# Patient Record
Sex: FEMALE | Race: ASIAN
[De-identification: names, ages, dates, MRNs, and addresses within clinical notes are randomized per-mention and may not be internally consistent; named-entity substitution may affect disease eponyms.]

---

## 2018-02-08 ENCOUNTER — TRANSCRIPTION ENCOUNTER (OUTPATIENT)
Age: 53
End: 2018-02-08

## 2018-02-19 ENCOUNTER — TRANSCRIPTION ENCOUNTER (OUTPATIENT)
Age: 53
End: 2018-02-19

## 2018-03-08 ENCOUNTER — TRANSCRIPTION ENCOUNTER (OUTPATIENT)
Age: 53
End: 2018-03-08

## 2018-07-11 ENCOUNTER — TRANSCRIPTION ENCOUNTER (OUTPATIENT)
Age: 53
End: 2018-07-11

## 2018-07-30 ENCOUNTER — TRANSCRIPTION ENCOUNTER (OUTPATIENT)
Age: 53
End: 2018-07-30

## 2019-10-28 ENCOUNTER — TRANSCRIPTION ENCOUNTER (OUTPATIENT)
Age: 54
End: 2019-10-28

## 2020-05-29 ENCOUNTER — TRANSCRIPTION ENCOUNTER (OUTPATIENT)
Age: 55
End: 2020-05-29

## 2020-08-06 ENCOUNTER — EMERGENCY (EMERGENCY)
Facility: HOSPITAL | Age: 55
LOS: 1 days | Discharge: HOME | End: 2020-08-06
Attending: EMERGENCY MEDICINE
Payer: COMMERCIAL

## 2020-08-06 VITALS
DIASTOLIC BLOOD PRESSURE: 82 MMHG | TEMPERATURE: 98 F | WEIGHT: 134.04 LBS | OXYGEN SATURATION: 98 % | RESPIRATION RATE: 17 BRPM | SYSTOLIC BLOOD PRESSURE: 169 MMHG | HEART RATE: 69 BPM

## 2020-08-06 VITALS
DIASTOLIC BLOOD PRESSURE: 72 MMHG | TEMPERATURE: 98 F | RESPIRATION RATE: 16 BRPM | SYSTOLIC BLOOD PRESSURE: 158 MMHG | HEART RATE: 59 BPM | OXYGEN SATURATION: 100 %

## 2020-08-06 LAB — HIV 1 & 2 AB SERPL IA.RAPID: SIGNIFICANT CHANGE UP

## 2020-08-06 PROCEDURE — 93308 TTE F-UP OR LMTD: CPT | Mod: 26

## 2020-08-06 PROCEDURE — 99285 EMERGENCY DEPT VISIT HI MDM: CPT

## 2020-08-06 PROCEDURE — 93005 ELECTROCARDIOGRAM TRACING: CPT

## 2020-08-06 PROCEDURE — 71045 X-RAY EXAM CHEST 1 VIEW: CPT | Mod: 26

## 2020-08-06 PROCEDURE — 99284 EMERGENCY DEPT VISIT MOD MDM: CPT | Mod: 25

## 2020-08-06 PROCEDURE — 86703 HIV-1/HIV-2 1 RESULT ANTBDY: CPT

## 2020-08-06 PROCEDURE — 93010 ELECTROCARDIOGRAM REPORT: CPT

## 2020-08-06 PROCEDURE — 71045 X-RAY EXAM CHEST 1 VIEW: CPT

## 2020-08-06 PROCEDURE — 93308 TTE F-UP OR LMTD: CPT

## 2020-08-06 RX ORDER — IBUPROFEN 200 MG
600 TABLET ORAL ONCE
Refills: 0 | Status: COMPLETED | OUTPATIENT
Start: 2020-08-06 | End: 2020-08-06

## 2020-08-06 RX ADMIN — Medication 600 MILLIGRAM(S): at 11:00

## 2020-08-06 RX ADMIN — Medication 600 MILLIGRAM(S): at 10:19
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
## 2020-08-06 NOTE — ED PROVIDER NOTE - ATTENDING CONTRIBUTION TO CARE
I performed a history and physical exam of the patient and discussed their management with the student. I reviewed the students note and agree with the documented findings and plan of care. My medical decison making and observations are found above.
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
## 2020-08-06 NOTE — ED PROVIDER NOTE - PMH
Attempted to see pt for PT evaluation. Per [facility], pt was placed on bedrest until tomorrow. Will hold PT evaluation today and will check back tomorrow.    No pertinent past medical history <<----- Click to add NO pertinent Past Medical History

## 2020-08-06 NOTE — ED PROVIDER NOTE - NSFOLLOWUPINSTRUCTIONS_ED_ALL_ED_FT
1- Motrin / Tylenol as needed for pain  2- Follow up with your doctor or our medicine clinic   3- If you have any worsening pain, shortness of breath, palpitations, or any other concerns come back to the ER immediately.

## 2020-08-06 NOTE — ED PROVIDER NOTE - NSFOLLOWUPCLINICS_GEN_ALL_ED_FT
[facility] General Internal Medicine  General Internal Medicine  [address] [id number]  Phone: [phone]  Fax:   Follow Up Time:

## 2020-08-06 NOTE — ED PROVIDER NOTE - PATIENT PORTAL LINK FT
You can access the FollowMyHealth Patient Portal offered by [facility] by registering at the following website: [email or web address]. By joining [employer] [facility]s FollowMyHealth portal, you will also be able to view your health information using other applications (apps) compatible with our system.

## 2020-08-06 NOTE — ED PROVIDER NOTE - PHYSICAL EXAMINATION
rory aaaox3 nad ncat perrlgcs 15   neck supple cleared clinically   ctabl no crepitus no sq emphysema , mild ant sternal cw ttp   s1s2 rrr   soft nt nd   pelvis stable   le from no deformity no rotation, no shortening, ambulatory in ed   l knee no jt line ttp no patella ttp , from ,neg lachman ,ext mech intact small effsuion   cn2-12intact strength 5/5 ue le bl
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
## 2020-12-23 PROBLEM — Z78.9 OTHER SPECIFIED HEALTH STATUS: Chronic | Status: ACTIVE | Noted: 2020-08-06

## 2020-12-29 ENCOUNTER — APPOINTMENT (OUTPATIENT)
Dept: ORTHOPEDIC SURGERY | Facility: CLINIC | Age: 55
End: 2020-12-29
Payer: MEDICAID

## 2020-12-29 VITALS
WEIGHT: 130 LBS | OXYGEN SATURATION: 97 % | DIASTOLIC BLOOD PRESSURE: 95 MMHG | SYSTOLIC BLOOD PRESSURE: 165 MMHG | BODY MASS INDEX: 23.92 KG/M2 | HEART RATE: 73 BPM | HEIGHT: 62 IN

## 2020-12-29 DIAGNOSIS — Z87.898 PERSONAL HISTORY OF OTHER SPECIFIED CONDITIONS: ICD-10-CM

## 2020-12-29 DIAGNOSIS — Z86.79 PERSONAL HISTORY OF OTHER DISEASES OF THE CIRCULATORY SYSTEM: ICD-10-CM

## 2020-12-29 DIAGNOSIS — S46.011A STRAIN OF MUSCLE(S) AND TENDON(S) OF THE ROTATOR CUFF OF RIGHT SHOULDER, INITIAL ENCOUNTER: ICD-10-CM

## 2020-12-29 PROBLEM — Z00.00 ENCOUNTER FOR PREVENTIVE HEALTH EXAMINATION: Status: ACTIVE | Noted: 2020-12-29

## 2020-12-29 PROCEDURE — 20611 DRAIN/INJ JOINT/BURSA W/US: CPT | Mod: RT

## 2020-12-29 PROCEDURE — 72040 X-RAY EXAM NECK SPINE 2-3 VW: CPT

## 2020-12-29 PROCEDURE — 99204 OFFICE O/P NEW MOD 45 MIN: CPT | Mod: 25

## 2020-12-29 PROCEDURE — 73030 X-RAY EXAM OF SHOULDER: CPT | Mod: RT

## 2020-12-29 PROCEDURE — 99072 ADDL SUPL MATRL&STAF TM PHE: CPT

## 2020-12-29 RX ORDER — DICLOFENAC SODIUM 75 MG/1
75 TABLET, DELAYED RELEASE ORAL
Qty: 60 | Refills: 0 | Status: ACTIVE | COMMUNITY
Start: 2020-12-29 | End: 1900-01-01

## 2020-12-29 RX ORDER — ALBUTEROL SULFATE
POWDER (GRAM) MISCELLANEOUS
Refills: 0 | Status: ACTIVE | COMMUNITY

## 2020-12-29 NOTE — PHYSICAL EXAM
[de-identified] : Examination of the right shoulder discloses tenderness to the superior region at 160 degrees overhead motion and lateral abduction.  Impingement signs are positive.  Cervical spine muscle spasm to the Trapezial region.  No focal neurovascular deficits to the right upper extremity.  The patient is noting subjective radiating pain and numbness down the right arm. [de-identified] : X-rays taken of the right shoulder are nonspecific in AP lateral and axillary views.  X-rays of the cervical spine disclose cervical straightening with mild disc space narrowing.  Results of the patient's MRI of her right shoulder were discussed

## 2020-12-29 NOTE — PROCEDURE
[de-identified] : Under sterile technique the patient right shoulder was injected with Depo-Medrol and lidocaine.  Insert\par \par A discussion took place with the patient regarding the procedure. General risks and benefits were reviewed.\par The subacromial region of the right shoulder was prepped to attain a sterile field. Ethyl Chloride spray was used as a topical anesthetic. \par A 22-gauge 1-1/2 inch needle was used to inject the medication.\par The procedure was performed utilizing ultrasound guided needle placement with the Sonosite linear transducer in order to visualize and confirm the location of the needle tip and intra-articular delivery of the medication in the exact location desired to achieve maximal benefit from the medication. The images were recorded and saved.\par The injection site was sterilely dressed and the patient tolerated the procedure well, without complications.\par The patient was given post injection instructions including rest, cool pack applications, and take NSAIDs or acetaminophen. The patient was advised that if there are worsening symptoms or any associated problems, to contact our office accordingly

## 2020-12-29 NOTE — DISCUSSION/SUMMARY
[de-identified] : Following the patient's cortisone injection she was advised to continue her physical therapy for both her neck and her shoulder.  She was prescribed diclofenac and will be referred to Dr. Haro for her cervical spine issues.
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
## 2020-12-30 ENCOUNTER — APPOINTMENT (OUTPATIENT)
Dept: ORTHOPEDIC SURGERY | Facility: CLINIC | Age: 55
End: 2020-12-30
Payer: MEDICAID

## 2020-12-30 VITALS
HEIGHT: 62 IN | DIASTOLIC BLOOD PRESSURE: 85 MMHG | BODY MASS INDEX: 23.92 KG/M2 | HEART RATE: 56 BPM | WEIGHT: 130 LBS | OXYGEN SATURATION: 99 % | SYSTOLIC BLOOD PRESSURE: 146 MMHG

## 2020-12-30 DIAGNOSIS — Z78.9 OTHER SPECIFIED HEALTH STATUS: ICD-10-CM

## 2020-12-30 PROCEDURE — 72040 X-RAY EXAM NECK SPINE 2-3 VW: CPT

## 2020-12-30 PROCEDURE — 99214 OFFICE O/P EST MOD 30 MIN: CPT

## 2020-12-30 PROCEDURE — 99072 ADDL SUPL MATRL&STAF TM PHE: CPT

## 2020-12-30 NOTE — ASSESSMENT
[FreeTextEntry1] : This is a 55-year-old female here today for evaluation of right arm pain.  I am concerned given her right triceps and deltoid weakness that she has an acute disc herniation.  She has had an MRI in the past and was told that she had a herniated disc.  Unfortunately she does not have the disc here today.  At this point I think we should begin by starting a new round of physical therapy focused on isometric neck exercises and gentle neck stretching.  She should continue Tylenol and ibuprofen as needed for pain relief.  I will have her follow-up in approximately 2 weeks for repeat clinical evaluation and to go over imaging.  She knows to call back sooner if her symptoms worsen or change in any way.

## 2020-12-30 NOTE — PHYSICAL EXAM
[de-identified] : Cervical Physical Exam\par \par Gait -normal\par \par Station -normal\par \par Sagittal balance -normal\par \par Compensatory mechanism? -None\par \par Horizontal gaze -maintained\par \par Heel walk -normal\par \par Toe walk -normal\par \par Reflexes\par Biceps -normal\par Triceps -normal\par Brachioradialis -normal\par Patellar -normal\par Gastroc -normal\par Clonus -no\par \par Tracey´s -none\par \par Shoulder Exam - pain with forward flexion of the right shoulder\par \par Spurling´s -positive on right\par \par Wrist Pulses -2+ radial/ulnar\par \par Foot Pulses -2+ DP/PT\par \par Cervical range of motion -globally reduced\par \par Sensation \par C5-T1 sensation intact to light touch bilaterally\par \par L1-S1 sensation intact to light touch bilaterally\par \par Motor\par \par \par 	Deltoid	Biceps	Triceps	WF	WE	IO	\par Right	4/5	5/5	3+/5	4/5	5/5	5/5	5/5\par Left	5/5	5/5	5/5	5/5	5/5	5/5	5/5\par \par \par 	IP	Quad	HS	TA	Gastroc	EHL\par Right	5/5	5/5	5/5	5/5	5/5	5/5\par Left	5/5	5/5	5/5	5/5	5/5	5/5\par \par \par  [de-identified] : Cervical radiographs reviewed\par Cervical alignment within normal limits\par Disc height loss noted\par Mild facet arthropathy noted

## 2020-12-30 NOTE — HISTORY OF PRESENT ILLNESS
[de-identified] : This is a 55-year-old female here today for evaluation of her neck and arm pain.  The symptoms have been getting worse over the past several months.  They began in August 2020.  She states that she has pain that radiates down her right arm.  It goes down her lateral arm lateral forearm into her middle finger and ring finger.  She has 80%  arm  pain and 20%  neck pain.  The patient denies any issues with balance or issues with fine motor tasks.  She is already tried a round of conservative management including physical therapy, NSAIDs/Tylenol as well as other treatment modalities.

## 2021-01-13 ENCOUNTER — APPOINTMENT (OUTPATIENT)
Dept: ORTHOPEDIC SURGERY | Facility: CLINIC | Age: 56
End: 2021-01-13
Payer: MEDICAID

## 2021-01-13 PROCEDURE — 99214 OFFICE O/P EST MOD 30 MIN: CPT

## 2021-01-13 PROCEDURE — 99072 ADDL SUPL MATRL&STAF TM PHE: CPT

## 2021-01-13 NOTE — ASSESSMENT
[FreeTextEntry1] : I had a lengthy discussion with the patient in terms of her treatment plan and diagnosis.  At this point I think she would do well with continued conservative treatment.  This would include physical therapy.  She should take Tylenol and ibuprofen as needed for pain only.  I will see her again in 3 months for repeat clinical evaluation.  I encouraged her to reach out at any time if her symptoms worsen or change in any way.

## 2021-01-13 NOTE — HISTORY OF PRESENT ILLNESS
[de-identified] : This is a 55-year-old female here today for evaluation of her neck and arm pain.  The symptoms have been getting better since last time I saw the patient.  She has tried conservative measures including physical therapy which have helped her symptoms significantly.  She denies any radicular symptoms down her arms currently.  She denies any issues with balance or hand dexterity.  Overall she is pleased with her progress.

## 2021-01-13 NOTE — PHYSICAL EXAM
[de-identified] : Cervical Physical Exam\par \par Gait -normal\par \par Station -normal\par \par Sagittal balance -normal\par \par Compensatory mechanism? -None\par \par Horizontal gaze -maintained\par \par Heel walk -normal\par \par Toe walk -normal\par \par Reflexes\par Biceps -normal\par Triceps -normal\par Brachioradialis -normal\par Patellar -normal\par Gastroc -normal\par Clonus -no\par \par Tracey´s -none\par \par Shoulder Exam - pain with forward flexion of the right shoulder\par \par Spurling´s -negative\par \par Wrist Pulses -2+ radial/ulnar\par \par Foot Pulses -2+ DP/PT\par \par Cervical range of motion -globally reduced\par \par Sensation \par C5-T1 sensation intact to light touch bilaterally\par \par L1-S1 sensation intact to light touch bilaterally\par \par Motor\par \par \par 	Deltoid	Biceps	Triceps	WF	WE	IO	\par Right	5/5	5/5	5/5	5/5	5/5	5/5	5/5\par Left	5/5	5/5	5/5	5/5	5/5	5/5	5/5\par \par \par 	IP	Quad	HS	TA	Gastroc	EHL\par Right	5/5	5/5	5/5	5/5	5/5	5/5\par Left	5/5	5/5	5/5	5/5	5/5	5/5\par \par \par Much improved exam [de-identified] : Cervical MRI reviewed\par No areas of central canal stenosis\par Mild foraminal stenosis at C3-C4\par Spondylosis noted through the cervical spine.

## 2021-03-18 ENCOUNTER — TRANSCRIPTION ENCOUNTER (OUTPATIENT)
Age: 56
End: 2021-03-18

## 2021-04-26 ENCOUNTER — APPOINTMENT (OUTPATIENT)
Dept: ORTHOPEDIC SURGERY | Facility: CLINIC | Age: 56
End: 2021-04-26
Payer: COMMERCIAL

## 2021-04-26 VITALS
DIASTOLIC BLOOD PRESSURE: 87 MMHG | SYSTOLIC BLOOD PRESSURE: 153 MMHG | HEIGHT: 62 IN | WEIGHT: 130 LBS | BODY MASS INDEX: 23.92 KG/M2 | HEART RATE: 69 BPM

## 2021-04-26 DIAGNOSIS — M54.12 RADICULOPATHY, CERVICAL REGION: ICD-10-CM

## 2021-04-26 PROCEDURE — 99072 ADDL SUPL MATRL&STAF TM PHE: CPT

## 2021-04-26 PROCEDURE — 99214 OFFICE O/P EST MOD 30 MIN: CPT

## 2021-04-27 NOTE — ASSESSMENT
[FreeTextEntry1] : This is a 55-year-old female here today for predominantly right shoulder complaints.  I assured her today that she has no critical findings on her cervical MRI.  Furthermore she does not have any large tears based upon a read from a right shoulder MRI from December 2020.  As a result I think she should continue with physical therapy.  I have referred her to pain management for possible other treatment options for her complaints.  She can take Tylenol and ibuprofen as needed for pain.  She can follow-up with me in 3 months for repeat clinical evaluation.  Please note that over 30 minutes of time was spent in care of this patient which includes previsit preparation, in person visit, post visit documentation.

## 2021-04-27 NOTE — PHYSICAL EXAM
[de-identified] : Cervical Physical Exam\par \par Gait -normal\par \par Station -normal\par \par Sagittal balance -normal\par \par Compensatory mechanism? -None\par \par Horizontal gaze -maintained\par \par Heel walk -normal\par \par Toe walk -normal\par \par Reflexes\par Biceps -normal\par Triceps -normal\par Brachioradialis -normal\par Patellar -normal\par Gastroc -normal\par Clonus -no\par \par Tracey´s -none\par \par Shoulder Exam - pain with forward flexion of the right shoulder\par \par Spurling´s -negative\par \par Wrist Pulses -2+ radial/ulnar\par \par Foot Pulses -2+ DP/PT\par \par Cervical range of motion -globally reduced\par \par Sensation \par C5-T1 sensation intact to light touch bilaterally\par \par L1-S1 sensation intact to light touch bilaterally\par \par Motor\par \par \par 	Deltoid	Biceps	Triceps	WF	WE	IO	\par Right	5/5	5/5	5/5	5/5	5/5	5/5	5/5\par Left	5/5	5/5	5/5	5/5	5/5	5/5	5/5\par \par \par 	IP	Quad	HS	TA	Gastroc	EHL\par Right	5/5	5/5	5/5	5/5	5/5	5/5\par Left	5/5	5/5	5/5	5/5	5/5	5/5\par \par \par Much improved exam [de-identified] : Cervical MRI reviewed\par No areas of central canal stenosis\par Mild foraminal stenosis at C3-C4\par Spondylosis noted through the cervical spine.

## 2021-04-27 NOTE — HISTORY OF PRESENT ILLNESS
[de-identified] : This is a 55-year-old female here today for evaluation of her neck and arm pain.  The symptoms have been getting better since last time I saw the patient.  She has tried conservative measures including physical therapy which have helped her symptoms significantly.  She denies any radicular symptoms down her arms currently.  She denies any issues with balance or hand dexterity.  Overall she is pleased with her progress.\par \par The above history is from the patient's previous visit\par \par Today the patient complains of right clavicle/shoulder pain.  She denies any bowel bladder issues.  She denies any saddle anesthesia.  She denies any balance issues.  She denies any hand dexterity problems.  She has no focal areas of weakness.  She does have some discomfort with overhead activities especially involving her right shoulder.

## 2021-05-07 ENCOUNTER — APPOINTMENT (OUTPATIENT)
Dept: PHYSICAL MEDICINE AND REHAB | Facility: CLINIC | Age: 56
End: 2021-05-07
Payer: COMMERCIAL

## 2021-05-07 VITALS
SYSTOLIC BLOOD PRESSURE: 129 MMHG | WEIGHT: 137 LBS | HEIGHT: 62 IN | RESPIRATION RATE: 14 BRPM | OXYGEN SATURATION: 100 % | HEART RATE: 69 BPM | BODY MASS INDEX: 25.21 KG/M2 | DIASTOLIC BLOOD PRESSURE: 82 MMHG

## 2021-05-07 DIAGNOSIS — M79.18 MYALGIA, OTHER SITE: ICD-10-CM

## 2021-05-07 PROCEDURE — 99072 ADDL SUPL MATRL&STAF TM PHE: CPT

## 2021-05-07 PROCEDURE — 99203 OFFICE O/P NEW LOW 30 MIN: CPT

## 2021-05-07 NOTE — PHYSICAL EXAM
[FreeTextEntry1] : PE:\par Constitutional: In NAD, calm and cooperative\par HEENT: NCAT, Anicteric sclera, no lid-lag\par Cardio: Extremities appear pink and well perfused, no peripheral edema\par Respiratory: Normal respiratory effort on room air, no accessory muscle use\par Skin: no rashes seen on exposed skin, no visible abrasions\par Psych: Normal affect, intact judgment and insight\par Neuro: Awake, alert and oriented x 3, see below for focused neurological exam\par MSK\par 	Inspection: no gross swelling identified\par 	Palpation: Tenderness over anterior/lateral shoulder shoulder, Mild TTP over R clavicle\par 	ROM: Minimal restriction in IE, ER and Shoulder Abduction\par 	Strength: 5/5 strength in bilateral upper extremities\par 	Reflexes: 2+ Biceps/Brachioradialis reflex bilaterally, Tracey’s negative bilaterally\par 	Sensation: Intact to light touch in bilateral upper extremities\par Special tests: \par Spurling's: negative\par Neer's test: positive on R\par Hawkin's sign:positive on R
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
## 2021-05-07 NOTE — ASSESSMENT
[FreeTextEntry1] : Ms. KEISHA CARDOZO is a 55 year old female who presents with chronic R shoulder/neck pain, likely due to chronic RTC tendonitis, myofascial pain rather than cervical radiculopathy. Patient has said she has had good relief with a R shoulder injection in past. Will recommend:\par - Went on R/B/A of a R SASD bursa injection today with patient but as she had her 2nd COVID vaccine last week, will hold off on any steroid injections at this time. \par - As patient is unsure if she ever had a clavicle X-ray, will order it to rule out any bony abnormalities. \par - Patient to continue HEP\par \par RTC in 1-2 weeks. Can consider steroid injection at that time. Patient educated on red flag signs including any changes to their bowel/bladder control, groin numbness or new weakness. Patient knows to seek immediate attention by calling 911 or going to nearest ER if these symptoms appear.

## 2021-05-22 ENCOUNTER — OUTPATIENT (OUTPATIENT)
Dept: OUTPATIENT SERVICES | Facility: HOSPITAL | Age: 56
LOS: 1 days | End: 2021-05-22
Payer: COMMERCIAL

## 2021-05-22 ENCOUNTER — APPOINTMENT (OUTPATIENT)
Dept: RADIOLOGY | Facility: CLINIC | Age: 56
End: 2021-05-22
Payer: COMMERCIAL

## 2021-05-22 DIAGNOSIS — M89.8X1 OTHER SPECIFIED DISORDERS OF BONE, SHOULDER: ICD-10-CM

## 2021-05-22 PROCEDURE — 73000 X-RAY EXAM OF COLLAR BONE: CPT

## 2021-05-22 PROCEDURE — 73000 X-RAY EXAM OF COLLAR BONE: CPT | Mod: 26,RT

## 2021-06-15 ENCOUNTER — APPOINTMENT (OUTPATIENT)
Dept: PHYSICAL MEDICINE AND REHAB | Facility: CLINIC | Age: 56
End: 2021-06-15
Payer: COMMERCIAL

## 2021-06-15 VITALS
OXYGEN SATURATION: 100 % | HEIGHT: 62 IN | TEMPERATURE: 96.8 F | SYSTOLIC BLOOD PRESSURE: 134 MMHG | DIASTOLIC BLOOD PRESSURE: 80 MMHG | HEART RATE: 71 BPM | BODY MASS INDEX: 25.21 KG/M2 | WEIGHT: 137 LBS

## 2021-06-15 DIAGNOSIS — M75.80 OTHER SHOULDER LESIONS, UNSPECIFIED SHOULDER: ICD-10-CM

## 2021-06-15 PROCEDURE — 20610 DRAIN/INJ JOINT/BURSA W/O US: CPT | Mod: RT

## 2021-06-15 PROCEDURE — 99213 OFFICE O/P EST LOW 20 MIN: CPT | Mod: 25
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
## 2021-06-15 NOTE — DATA REVIEWED
[FreeTextEntry1] : \par  Xray Clavicle, Right             Final\par \par No Documents Attached\par \par \par \par \par   EXAM:  XR CLAVICLE COMPLETE RT\par \par \par PROCEDURE DATE:  05/22/2021\par \par \par \par INTERPRETATION:  CLINICAL INDICATION: right clavicle pain\par \par EXAM:\par AP and axial right clavicle from 5/22/2021 at 0935. Compared to appearance on chest radiograph from 8/6/2020.\par \par IMPRESSION:\par No focal clavicular fractures, deformities, or expansile destructive lytic or blastic lesions.\par \par Preserved intact and aligned visualized AC, sternoclavicular, and glenohumeral joint spaces.\par \par Clear visualized lung apices and midline normal caliber trachea.\par \par \par \par \par \par \par TIBURCIO MCCAULEY M.D., ATTENDING RADIOLOGIST\par This document has been electronically signed. May 24 2021  9:39AM\par \par  \par \par  Ordered by: MARINE SCHULZ       Collected/Examined: 22May2021 09:35AM       \par Verified by: MARINE SCHULZ 24May2021 10:18AM       \par  Result Communication: No patient communication needed at this time;\par Stage: Final       \par  Performed at: [facility] [place]       Resulted: 24May2021 09:38AM       Last Updated: 24May2021 10:18AM       Accession: [id number]

## 2021-06-15 NOTE — PROCEDURE
[de-identified] : Reason for procedure: Right shoulder pain\par \par Procedure: Right subacromial bursa injection\par Physician: Donte Dutta D.O.\par Medication injected: 40 MG Kenalog, 3 CC Lidocaine 1%\par Sedation medications: None\par Estimated blood loss: None\par Complications: None\par \par Technique: R/B/A to Right SASD corticosteroid injection were reviewed.  The patient is agreeable and wishes to proceed. The patient was placed in seated position.  The area was prepped in normal sterile fashion with Chloroprep x3.  A 25 gauge, 1.5 inch needle was advanced into the R subacromial bursa.  After negative aspiration of heme, the above medications were injected into the SASD BURSA. Needle was then removed and bandaid placed over injection site.  There were no complications. The patient was provided with post injection instructions and noted improvement in pain and ROM after injection.

## 2021-06-15 NOTE — PHYSICAL EXAM
[FreeTextEntry1] : PE:\par Constitutional: In NAD, calm and cooperative\par HEENT: NCAT, Anicteric sclera, no lid-lag\par Cardio: Extremities appear pink and well perfused, no peripheral edema\par Respiratory: Normal respiratory effort on room air, no accessory muscle use\par Skin: no rashes seen on exposed skin, no visible abrasions\par Psych: Normal affect, intact judgment and insight\par Neuro: Awake, alert and oriented x 3, see below for focused neurological exam\par MSK\par 	Inspection: no gross swelling identified, some mild protuberance of her R medial clavicle\par 	Palpation: Tenderness over anterior/lateral shoulder shoulder, Mild TTP over R clavicle\par 	ROM: Minimal restriction in IE, ER and Shoulder Abduction\par 	Strength: 5/5 strength in bilateral upper extremities\par 	Reflexes: 2+ Biceps/Brachioradialis reflex bilaterally, Tracey’s negative bilaterally\par 	Sensation: Intact to light touch in bilateral upper extremities\par Special tests: \par Spurling's: negative\par Neer's test: positive on R\par Hawkin's sign:positive on R

## 2021-06-15 NOTE — ASSESSMENT
[FreeTextEntry1] : Ms. KEISHA CARDOZO is a 55 year old female who presents with chronic R shoulder/neck pain, likely due to chronic RTC tendonitis, myofascial pain rather than cervical radiculopathy. Patient has said she has had good relief with a R shoulder injection in past. Patient now continued pain in R clavicle with some protuberance of medial head, Will recommend:\par - S/p R subacromial bursa injection today, tolerated well. \par - MRI R clavicle given continued pain and visible protuberance of medial head\par - Patient to continue HEP\par \par RTC in 2 weeks. Patient aware of red flag signs including any changes to their bowel/bladder control, groin numbness or new weakness. Patient knows to seek immediate attention by calling 911 or going to nearest ER if these symptoms appear.

## 2021-06-23 ENCOUNTER — APPOINTMENT (OUTPATIENT)
Dept: MRI IMAGING | Facility: HOSPITAL | Age: 56
End: 2021-06-23

## 2021-06-24 RX ORDER — DIAZEPAM 5 MG/1
5 TABLET ORAL
Qty: 1 | Refills: 0 | Status: ACTIVE | COMMUNITY
Start: 2021-06-24 | End: 1900-01-01

## 2021-06-29 ENCOUNTER — APPOINTMENT (OUTPATIENT)
Dept: PHYSICAL MEDICINE AND REHAB | Facility: CLINIC | Age: 56
End: 2021-06-29

## 2021-09-07 ENCOUNTER — APPOINTMENT (OUTPATIENT)
Dept: PHYSICAL MEDICINE AND REHAB | Facility: CLINIC | Age: 56
End: 2021-09-07
Payer: COMMERCIAL

## 2021-09-07 VITALS
SYSTOLIC BLOOD PRESSURE: 155 MMHG | DIASTOLIC BLOOD PRESSURE: 94 MMHG | OXYGEN SATURATION: 98 % | TEMPERATURE: 96.6 F | HEART RATE: 84 BPM

## 2021-09-07 DIAGNOSIS — M25.511 PAIN IN RIGHT SHOULDER: ICD-10-CM

## 2021-09-07 PROCEDURE — 99213 OFFICE O/P EST LOW 20 MIN: CPT
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
## 2021-09-07 NOTE — ASSESSMENT
[FreeTextEntry1] : Ms. KEISHA CARDOZO is a 55 year old female who presents with chronic R shoulder?clavicle pain. Shoulder pain is overall improved but she is still having R clavicle discomfort. MRI showed likely old injury changes but also showed likely reactive lymph nodes. Denies any red flag signs. \par - Will refer patient to ortho for opinion on this chronic R clavicular pain. \par - Advised patient to can take an OTC NSAID/Tylenol if needed, although she says she tries not to take anything\par - Patient to bring MRI report to PCP for further evaluation regarding enlarged lymph nodes. Patient is also undergoing a 2nd MRI this week of the clavicle as the facility called her back, for unknown reasons. \par \par RTC after ortho evaluation/PRN. Patient aware of red flag signs including any changes to their bowel/bladder control, groin numbness or new weakness. Patient knows to seek immediate attention by calling 911 or going to nearest ER if these symptoms appear.

## 2021-09-07 NOTE — PHYSICAL EXAM
[FreeTextEntry1] : PE:\par Constitutional: In NAD, calm and cooperative\par HEENT: NCAT, Anicteric sclera, no lid-lag\par Cardio: Extremities appear pink and well perfused, no peripheral edema\par Respiratory: Normal respiratory effort on room air, no accessory muscle use\par Skin: no rashes seen on exposed skin, no visible abrasions\par Psych: Normal affect, intact judgment and insight\par Neuro: Awake, alert and oriented x 3, see below for focused neurological exam\par MSK\par 	Inspection: no gross swelling identified, some mild protuberance of her R medial clavicle\par 	Palpation: No Tenderness over anterior/lateral shoulder shoulder, Mild TTP over R clavicle\par 	ROM: Minimal restriction in IE, ER and Shoulder Abduction\par 	Strength: 5/5 strength in bilateral upper extremities\par 	Reflexes: 2+ Biceps/Brachioradialis reflex bilaterally, Tracey’s negative bilaterally\par 	Sensation: Intact to light touch in bilateral upper extremities\par Special tests: \par Spurling's: negative\par Neer's test: negative\par Hawkin's sign:negative
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
## 2021-11-19 ENCOUNTER — APPOINTMENT (OUTPATIENT)
Dept: ORTHOPEDIC SURGERY | Facility: CLINIC | Age: 56
End: 2021-11-19
Payer: COMMERCIAL

## 2021-11-19 VITALS
DIASTOLIC BLOOD PRESSURE: 82 MMHG | SYSTOLIC BLOOD PRESSURE: 128 MMHG | WEIGHT: 132 LBS | HEART RATE: 73 BPM | BODY MASS INDEX: 24.29 KG/M2 | HEIGHT: 62 IN

## 2021-11-19 DIAGNOSIS — M89.8X1 OTHER SPECIFIED DISORDERS OF BONE, SHOULDER: ICD-10-CM

## 2021-11-19 PROCEDURE — 99214 OFFICE O/P EST MOD 30 MIN: CPT

## 2021-11-19 NOTE — PHYSICAL EXAM
[de-identified] : Cervical Physical Exam\par \par Gait -normal\par \par Station -normal\par \par Sagittal balance -normal\par \par Compensatory mechanism? -None\par \par Horizontal gaze -maintained\par \par Heel walk -normal\par \par Toe walk -normal\par \par Reflexes\par Biceps -normal\par Triceps -normal\par Brachioradialis -normal\par Patellar -normal\par Gastroc -normal\par Clonus -no\par \par Tracey´s -none\par \par Shoulder Exam - pain with forward flexion of the right shoulder\par \par Spurling´s -negative\par \par Wrist Pulses -2+ radial/ulnar\par \par Foot Pulses -2+ DP/PT\par \par Cervical range of motion -globally reduced\par \par Sensation \par C5-T1 sensation intact to light touch bilaterally\par \par L1-S1 sensation intact to light touch bilaterally\par \par Motor\par \par \par 	Deltoid	Biceps	Triceps	WF	WE	IO	\par Right	5/5	5/5	5/5	5/5	5/5	5/5	5/5\par Left	5/5	5/5	5/5	5/5	5/5	5/5	5/5\par \par \par 	IP	Quad	HS	TA	Gastroc	EHL\par Right	5/5	5/5	5/5	5/5	5/5	5/5\par Left	5/5	5/5	5/5	5/5	5/5	5/5\par \par \par Much improved exam [de-identified] : Cervical MRI reviewed\par No areas of central canal stenosis\par Mild foraminal stenosis at C3-C4\par Spondylosis noted through the cervical spine.\par \par Right clavicle MRI does not show any fracture

## 2021-11-19 NOTE — ASSESSMENT
[FreeTextEntry1] : I lengthy discussion with the patient regards to her treatment plan and diagnosis.  At this point the patient is in severe disabling pain.  Her imaging findings do not seem to indicate that she is having any issues that could be fixed with the surgery.  At this point I would encourage her to continue with her activity daily living as able.  She may need further treatment down the road but at this time we will continue with more conservative care excluding surgery.  She can follow-up with me on an as-needed basis.

## 2021-11-19 NOTE — HISTORY OF PRESENT ILLNESS
[de-identified] : Today the patient states that she is still having significant right shoulder pain.  This is disabling for her.  It is interfering with her quality of life.  She denies any radicular type symptoms.  She denies any issues with balance or hand extremity.\par \par 04/26/21\par This is a 55-year-old female here today for evaluation of her neck and arm pain.  The symptoms have been getting better since last time I saw the patient.  She has tried conservative measures including physical therapy which have helped her symptoms significantly.  She denies any radicular symptoms down her arms currently.  She denies any issues with balance or hand dexterity.  Overall she is pleased with her progress.\par \par The above history is from the patient's previous visit\par \par Today the patient complains of right clavicle/shoulder pain.  She denies any bowel bladder issues.  She denies any saddle anesthesia.  She denies any balance issues.  She denies any hand dexterity problems.  She has no focal areas of weakness.  She does have some discomfort with overhead activities especially involving her right shoulder.

## 2022-02-04 ENCOUNTER — APPOINTMENT (OUTPATIENT)
Dept: ORTHOPEDIC SURGERY | Facility: CLINIC | Age: 57
End: 2022-02-04
